# Patient Record
(demographics unavailable — no encounter records)

---

## 2025-07-29 NOTE — HISTORY OF PRESENT ILLNESS
[(# ___ in the past year)] : [unfilled] visits to the emergency room in the past year [< or = 2 days/wk] : < than or = 2 days/wk [0 x/month] : 0 x/month [None] : None [de-identified] : Fatemeh Wall was brought in by her mother for a follow-up regarding her asthma. Her mother mentioned that she is participating in exercise programs provided by their [facility] and [employer]. During her exercise sessions, tests are being conducted, including a pulmonary function test. The doctor indicated that her lung function is slightly below normal, prompting her mother to seek a second opinion. Occasionally, she experiences paroxysmal nocturnal dyspnea (PND), but it resolves over time. Fatemeh remains very active and utilizes her albuterol as necessary.

## 2025-07-29 NOTE — HISTORY OF PRESENT ILLNESS
[(# ___ in the past year)] : [unfilled] visits to the emergency room in the past year [< or = 2 days/wk] : < than or = 2 days/wk [0 x/month] : 0 x/month [None] : None [de-identified] : Fatemeh Wall was brought in by her mother for a follow-up regarding her asthma. Her mother mentioned that she is participating in exercise programs provided by their [facility] and [employer]. During her exercise sessions, tests are being conducted, including a pulmonary function test. The doctor indicated that her lung function is slightly below normal, prompting her mother to seek a second opinion. Occasionally, she experiences paroxysmal nocturnal dyspnea (PND), but it resolves over time. Fatemeh remains very active and utilizes her albuterol as necessary.

## 2025-07-29 NOTE — PHYSICAL EXAM

## 2025-07-29 NOTE — PHYSICAL EXAM

## 2025-07-29 NOTE — IMPRESSION
[Pulmonary Function Test] : Pulmonary Function Test [Mild] : (mild) [FreeTextEntry1] : FEV1 IS 85% OF PRED